# Patient Record
Sex: MALE | Race: WHITE
[De-identification: names, ages, dates, MRNs, and addresses within clinical notes are randomized per-mention and may not be internally consistent; named-entity substitution may affect disease eponyms.]

---

## 2018-04-28 ENCOUNTER — HOSPITAL ENCOUNTER (EMERGENCY)
Dept: HOSPITAL 59 - ER | Age: 53
Discharge: LEFT BEFORE DISCHARGE | End: 2018-04-28
Payer: COMMERCIAL

## 2018-04-28 DIAGNOSIS — R06.00: ICD-10-CM

## 2018-04-28 DIAGNOSIS — F14.10: ICD-10-CM

## 2018-04-28 DIAGNOSIS — R07.89: Primary | ICD-10-CM

## 2018-04-28 DIAGNOSIS — F17.210: ICD-10-CM

## 2018-04-28 LAB
ALBUMIN SERPL-MCNC: 4.4 G/DL (ref 4–5)
ALBUMIN/GLOB SERPL: 1.7 {RATIO} (ref 1.1–1.8)
ALP SERPL-CCNC: 101 U/L (ref 40–129)
ALT SERPL-CCNC: 22 U/L (ref ?–41)
ANION GAP SERPL CALC-SCNC: 15 MMOL/L (ref 7–16)
APTT BLD: 33.8 SECONDS (ref 24.5–39.1)
AST SERPL-CCNC: 19 U/L (ref 10–50)
BASOPHILS NFR BLD: 0.4 % (ref 0–6)
BILIRUB SERPL-MCNC: 0.4 MG/DL (ref 0.2–1)
BUN SERPL-MCNC: 15 MG/DL (ref 6–20)
CO2 SERPL-SCNC: 28 MMOL/L (ref 22–29)
CREAT SERPL-MCNC: 0.8 MG/DL (ref 0.7–1.2)
CREATINE PHOSPHOKINASE: 162 U/L (ref 39–308)
EOSINOPHIL NFR BLD: 4.5 % (ref 0–6)
ERYTHROCYTE [DISTWIDTH] IN BLOOD BY AUTOMATED COUNT: 14.3 % (ref 11.5–14.5)
EST GLOMERULAR FILTRATION RATE: > 60 ML/MIN
GLOBULIN SER-MCNC: 2.6 GM/DL (ref 1.4–4.8)
GLUCOSE SERPL-MCNC: 163 MG/DL (ref 74–109)
GRANULOCYTES NFR BLD: 62.7 % (ref 47–80)
HCT VFR BLD CALC: 43.2 % (ref 42–52)
HGB BLD-MCNC: 13.6 GM/DL (ref 14–18)
INR PPP: 1
LYMPHOCYTES NFR BLD AUTO: 19.7 % (ref 16–45)
MCH RBC QN AUTO: 26.9 PG (ref 27–33)
MCHC RBC AUTO-ENTMCNC: 31.5 G/DL (ref 32–36)
MCV RBC AUTO: 85.5 FL (ref 81–97)
MONOCYTES NFR BLD: 12.7 % (ref 0–9)
PLATELET # BLD: 317 K/UL (ref 130–400)
PMV BLD AUTO: 9.4 FL (ref 7.4–10.4)
PROT SERPL-MCNC: 7 G/DL (ref 6.6–8.7)
PROTHROMBIN TIME: 10.8 SECONDS (ref 9.5–12.1)
RBC # BLD AUTO: 5.05 M/UL (ref 4.4–5.7)
WBC # BLD AUTO: 8 K/UL (ref 4.2–12.2)

## 2018-04-28 PROCEDURE — 84484 ASSAY OF TROPONIN QUANT: CPT

## 2018-04-28 PROCEDURE — 93005 ELECTROCARDIOGRAM TRACING: CPT

## 2018-04-28 PROCEDURE — 80053 COMPREHEN METABOLIC PANEL: CPT

## 2018-04-28 PROCEDURE — 85610 PROTHROMBIN TIME: CPT

## 2018-04-28 PROCEDURE — 82550 ASSAY OF CK (CPK): CPT

## 2018-04-28 PROCEDURE — 99284 EMERGENCY DEPT VISIT MOD MDM: CPT

## 2018-04-28 PROCEDURE — 85730 THROMBOPLASTIN TIME PARTIAL: CPT

## 2018-04-28 PROCEDURE — 85025 COMPLETE CBC W/AUTO DIFF WBC: CPT

## 2018-04-28 PROCEDURE — 85379 FIBRIN DEGRADATION QUANT: CPT

## 2018-04-28 PROCEDURE — 93010 ELECTROCARDIOGRAM REPORT: CPT

## 2018-04-28 PROCEDURE — 71046 X-RAY EXAM CHEST 2 VIEWS: CPT

## 2018-04-28 NOTE — EMERGENCY DEPARTMENT RECORD
History of Present Illness





- General


Chief Complaint: Chest Pain


Stated Complaint: CHEST PAIN


Time Seen by Provider: 18 07:18


Source: Patient


Mode of Arrival: Ambulatory


Limitations: No limitations





- History of Present Illness


Initial Comments: 


51 yo male presents with chest pain.  The onset was yesterday.  The pain is on 

the left chest.  He works a physical job.  He typically has muscle and joint 

pain.  He does have some shoulder and scapular pain that is typical for him.  

He also expresses that he has GI reflux.  These pains are fairly common.  The 

current chest pain is new.  He states it is a pressure type sensation.  No 

cough.  No shortness of breath.  He does admit to occasional cocaine (crack).  

He is a smoker.  He does not know his full family history.  No prior CAD.  He 

states the new pain is not worse with palpation.  The pain he has with moving 

he states is not unusual for him.





MD Complaint: Chest pain


Onset/Timin


-: Days(s)


Onset: During exertion


Pain Location: Left chest


Pain Radiation: LUE


Severity: Mild


Severity scale (1-10): 5


Quality: Heaviness


Consistency: Constant


Improves With: Nothing


Worsens With: Nothing


Treatments Prior to Arrival: None





- Related Data


 Home Medications











 Medication  Instructions  Recorded  Confirmed  Last Taken


 


No Home Med [NO HOME MEDS]  18 Unknown











 Allergies











Allergy/AdvReac Type Severity Reaction Status Date / Time


 


No Known Drug Allergies Allergy   Verified 18 07:15














Travel Screening





- Travel/Exposure Within Last 30 Days


Have you traveled within the last 30 days?: No





Review of Systems


Constitutional: Denies: Chills, Fever, Malaise, Weakness


Eyes: Denies: Eye discharge


ENT: Denies: Congestion, Throat pain


Respiratory: Denies: Cough, Dyspnea, Hemoptysis, Stridor, Wheezes


Cardiovascular: Reports: Chest pain.  Denies: Palpitations, Syncope


Endocrine: Denies: Fatigue, Polydipsia, Polyuria


Gastrointestinal: Denies: Abdominal pain, Diarrhea, Nausea, Vomiting


Genitourinary: Denies: Dysuria, Frequency, Hematuria


Musculoskeletal: Reports: Arthralgia, Back pain, Myalgia


Skin: Denies: Bruising, Change in color, Rash


Neurological: Denies: Headache, Numbness, Weakness


Psychiatric: Denies: Anxiety


Hematological/Lymphatic: Denies: Blood Clots, Easy bleeding, Easy bruising, 

Swollen glands





Past Medical History





- SOCIAL HISTORY


Smoking Status: Light tobacco smoker (<10/day)


Alcohol Use: Occasional, Heavy


Drug Use Detail:: Cocaine, Marijuana, Methamphetamine





- RESPIRATORY


Hx Respiratory Disorders: No





- CARDIOVASCULAR


Hx Cardio Disorders: No





- NEURO


Hx Neuro Disorders: No





- GI


Hx GI Disorders: No





- 


Hx Genitourinary Disorders: No





- ENDOCRINE


Hx Endocrine Disorders: No





- MUSCULOSKELETAL


Hx Musculoskeletal Disorders: No





- PSYCH


Hx Psych Problems: No





- HEMATOLOGY/ONCOLOGY


Hx Hematology/Oncology Disorders: No





Family Medical History


Any Significant Family History?: Yes


Hx Heart Disease: Grandparents





Physical Exam





- General


General Appearance: Alert, Oriented x3, Cooperative, No acute distress


Limitations: No limitations





- Head


Head exam: Normal inspection





- Eye


Eye exam: Normal appearance, PERRL.  negative: Conjunctival injection, Scleral 

icterus





- ENT


ENT exam: Normal exam, Mucous membranes moist


Ear exam: Normal external inspection


Nasal Exam: Normal inspection


Mouth exam: Normal external inspection





- Neck


Neck exam: Normal inspection, Full ROM.  negative: Tenderness





- Respiratory


Respiratory exam: Normal lung sounds bilaterally.  negative: Accessory muscle 

use, Chest wall tenderness, Decreased breath sounds, Prolonged expiratory, 

Respiratory distress, Rhonchi, Stridor, Wheezes





- Cardiovascular


Cardiovascular Exam: Regular rate, Normal rhythm, Normal heart sounds


Peripheral Pulses: 2+: Radial (R), Radial (L)





- GI/Abdominal


GI/Abdominal exam: Soft.  negative: Tenderness





- Rectal


Rectal exam: Deferred





- 


 exam: Deferred





- Extremities


Extremities exam: Normal inspection, Full ROM, Normal capillary refill.  

negative: Tenderness





- Back


Back exam: Reports: Normal inspection, Full ROM.  Denies: Muscle spasm, Rash 

noted, Tenderness





- Neurological


Neurological exam: Alert, Normal gait, Oriented X3, Reflexes normal





- Psychiatric


Psychiatric exam: Normal affect, Normal mood





- Skin


Skin exam: Dry, Intact, Normal color, Warm





Course





 Vital Signs











  18





  07:17


 


Temperature 98.2 F


 


Pulse Rate [ 84





Cardiac Monitor 





] 


 


Respiratory 16





Rate 


 


Blood Pressure 145/86





[Right Arm] 


 


Pulse Ox 16 L














- Reevaluation(s)


Reevaluation #1: 


EKG 0715 NSR, rate is 82, intervals normal, axis normal, ST normal. Comparison 

EKG 10/3/2002.  New flat/inversion T wave in III and new upright T is AVL.


18 07:20


The labs were reviewed


No acute changes with the CBC, CMP


The Troponin,CK and D-dimer are negative.


18 07:59


The CXR was preliminarily reviewed by me.  No acute process.


18 08:15


I explained the results and concerns that this still could be cardiac in 

nature.  I recommend transfer for further work up.  There is no cardiology or 

stress testing available at [facility] on the weekends.  The patient was given these 

recommendations and will consider.  


18 08:16





18 08:53


I again offered transfer for further evaluation of the chest pain.  The patient 

is not willing to be admitted/transferred.  I explained again that blocked or 

narrowed coronary arteries are not ruled out and I recommend a stress test.  He 

understands my concerns.  He is not willing to be transferred and acknowledges 

the risks of heart attack and death could occur prior to outpatient testing.  I 

explained the AMA process and that he would need to signout AMA acknowledging 

the risk of leaving at this time.  He understands this and knows he can return 

or be seen in any hospital to further work up the chest pain.  He understands 

that there is significant risk in signing out AMA.  I did offer to refer him 

for outpatient follow up.  He accepted this offer.  He was instructed to take 

an aspirin daily as well.


18 10:09








Medical Decision Making





- Lab Data


Result diagrams: 


 18 07:17





 18 07:17





Disposition


Disposition: Discharge


Clinical Impression: 


Chest pain


Qualifiers:


 Chest pain type: unspecified Qualified Code(s): R07.9 - Chest pain, unspecified





Disposition: Against Medical Advice


Condition: (1) Good


Instructions:  Chest Pain (ED), Against Medical Advice (ED)


Additional Instructions: 


Return or be seen any time to complete testing on your chest pain


You are signing out AMA today


Take 325mg of Aspirin daily


Call Monday for the next available appointment with the cardiologist.


Referrals: 


ALEM GARCIA M.D. [MEDICAL DOCTOR] - 


[facility] [Provider Group]


Forms:  Patient Portal Access


Time of Disposition: 08:58





Quality





- Quality Measures


Quality Measures: N/A





- Blood Pressure Screening


Does Patient Have Any of the Following: No


Blood Pressure Classification: Hypertensive Reading


Systolic Measurement: 123


Diastolic Measurement: 91


Screening for High Blood Pressure: < Pre-Hypertensive BP, F/U Documented > [

]


Pre-Hypertensive Follow-up Interventions: Referral to alternative/primary care 

provider.

## 2018-04-29 NOTE — RADIOLOGY REPORT
EXAM:  CHEST 2 VIEWS



HISTORY:  DIFFICULTY BREATHING.



TECHNIQUE:  Frontal and lateral views of the chest were performed.



FINDINGS:  Heart size is normal. The lungs are hyperinflated. No infiltrate or 
pleural effusion. There are radiopaque loose bodies in the right shoulder 
girdle. There is degenerative change in both shoulder girdles. 



IMPRESSION:  HYPERINFLATED LUNGS. NO ACUTE PROCESS.



JOB NUMBER:  [id number]
MTDD